# Patient Record
Sex: FEMALE | Race: BLACK OR AFRICAN AMERICAN | Employment: OTHER | URBAN - METROPOLITAN AREA
[De-identification: names, ages, dates, MRNs, and addresses within clinical notes are randomized per-mention and may not be internally consistent; named-entity substitution may affect disease eponyms.]

---

## 2021-08-16 PROBLEM — I10 HYPERTENSION: Status: ACTIVE | Noted: 2021-08-16

## 2021-08-16 PROBLEM — E66.01 MORBID OBESITY (HCC): Status: ACTIVE | Noted: 2021-08-16

## 2021-08-26 ENCOUNTER — HOSPITAL ENCOUNTER (OUTPATIENT)
Dept: LAB | Age: 56
Discharge: HOME | End: 2021-08-26
Attending: SURGERY
Payer: COMMERCIAL

## 2021-08-26 DIAGNOSIS — Z01.812 ENCOUNTER FOR PRE-OPERATIVE LABORATORY TESTING: ICD-10-CM

## 2021-08-26 LAB
25(OH)D3 SERPL-MCNC: 29.3 NG/ML (ref 30–100)
EST. AVERAGE GLUCOSE BLD GHB EST-MCNC: 123 MG/DL
HBA1C MFR BLD: 5.9 % (ref 4.2–6.3)
TSH SERPL DL<=0.005 MIU/L-ACNC: 1.03 UIU/ML

## 2021-08-26 PROCEDURE — 83036 HEMOGLOBIN GLYCOSYLATED A1C: CPT

## 2021-08-26 PROCEDURE — 36415 COLL VENOUS BLD VENIPUNCTURE: CPT

## 2021-08-26 PROCEDURE — 80323 ALKALOIDS NOS: CPT

## 2021-08-26 PROCEDURE — 84443 ASSAY THYROID STIM HORMONE: CPT

## 2021-08-26 PROCEDURE — 82306 VITAMIN D 25 HYDROXY: CPT

## 2021-08-30 LAB
COTININE SERPL-MCNC: <1 NG/ML
NICOTINE SERPL-MCNC: <1 NG/ML

## 2021-09-03 ENCOUNTER — HOSPITAL ENCOUNTER (OUTPATIENT)
Dept: PHYSICAL THERAPY | Age: 56
Discharge: HOME | End: 2021-09-03
Attending: SURGERY
Payer: COMMERCIAL

## 2021-09-03 DIAGNOSIS — E66.01 MORBID OBESITY (HCC): ICD-10-CM

## 2021-09-03 PROCEDURE — 97161 PT EVAL LOW COMPLEX 20 MIN: CPT

## 2021-09-03 PROCEDURE — 97110 THERAPEUTIC EXERCISES: CPT

## 2021-09-03 NOTE — PROGRESS NOTES
Ramon Yarsanism  : 1965  Payor: Haleigh Manzo / Plan: [facility] / Product Type: PPO /  [address]  at [facility] PUJA ROMAN  [address] [facility], [address], [facility] [address]  [phone]   [phone]       OUTPATIENT PHYSICAL THERAPY: Daily Treatment Note 9/3/2021  Visit Count:1     ICD-10: Treatment Diagnosis: muscle weakness M62.81  Precautions/Allergies:   Latex, Penicillin g, and Sulfa (sulfonamide antibiotics)  TREATMENT PLAN:  Effective Dates: 9/3/2021 TO Today (9/3/2021). Frequency/Duration: 1 visit for HEP MEDICAL/REFERRING DIAGNOSIS:  Morbid obesity ([facility] [address] [E66.01]   DATE OF ONSET: chronic  REFERRING PHYSICIAN: Rosio Rios*  MD Orders: referral to physical therapy   Return MD Appointment: TBD       Pre-treatment Symptoms/Complaints:  See Eval  Pain: Initial:   0/10 Post Session:  0/10   Medications Last Reviewed:  9-3-21    Updated Objective Findings:   See evaluation note from today     TREATMENT:   Therapeutic Exercise: ( 15 minutes ): Instructed in HEP with UE exercises with green t-band - bicep curls, overhead press, shoulder abduction, chest press. Core exercises with bridges, alternating LEs hip/knee extension then bicycle with maintaining pelvic tilt. LE exercises with side lying hip abduction, side lying hip adduction, and wall squats. Pt demonstrate each exercise. Verbal cueing for body alignment and positioning. HEP: handout given with each exercise  [employer] [facility]    Treatment/Session Summary:    · Response to Treatment:  good return demonstration of HEP. rUban Morse   · Communication/Consultation:  None today  · Equipment provided today:  issued green tubing    Total Treatment Billable Duration:  15 minutes + Evaluation  PT Patient Time In/Time Out  Time In: 945  Time Out: [address] Jose Antonio, PT    Future Appointments   Date Time Provider Erica Serrato   2021 11:00 AM SFE PHONE APPOINTMENT [facility] SFE   2021 11:10 AM [address] THRU SSA IMD IMD   9/29/2021  8:15 AM SFD XR RF ROOM 2 SFDRAD BKD

## 2021-09-03 NOTE — THERAPY EVALUATION
Wander Argueta  : 1965  Primary: [facility] Nini Beatty*  Secondary:  [facility], [address], [address] [facility]  [phone]   [phone]       OUTPATIENT PHYSICAL THERAPY:Initial Assessment and Discharge Summary 9/3/2021     ICD-10: Treatment Diagnosis: muscle weakness M62.81  Precautions/Allergies:   Latex, Penicillin g, and Sulfa (sulfonamide antibiotics)  TREATMENT PLAN:  Effective Dates: 9/3/2021 TO Today (9/3/2021). Frequency/Duration: 1 visit for HEP MEDICAL/REFERRING DIAGNOSIS:  Morbid obesity ([facility] [address] [E66.01]   DATE OF ONSET: chronic   REFERRING PHYSICIAN: Anil Vuong*  MD Orders: Referral to physical therapy   Return MD Appointment: TBD     INITIAL ASSESSMENT:  Ms. Tuan Cardoso presents to physical therapy in preparation for bariatric surgery. She is currently walking 2-3 days/week for exercise. Instructed patient in UE, core, and LE strengthening exercises to incorporate into her exercise routine. Also discussed increasing her walking gradually to 5-6 days/week. Pt verbalizes understanding. No further PT needs at this time. PROBLEM LIST (Impacting functional limitations):  1. Decreased Strength INTERVENTIONS PLANNED: (Treatment may consist of any combination of the following)  1. Therapeutic Exercise/Strengthening     GOALS: (Goals have been discussed and agreed upon with patient.)  Discharge Goals: Time Frame: 1 visit  1. Pt will be independent with HEP. OUTCOME MEASURE:   Tool Used: Lower Extremity Functional Scale (LEFS)  Score:  Initial: 80/80 Most Recent: X (Date: -- )   Interpretation of Score: 20 questions each scored on a 5 point scale with 0 representing \"extreme difficulty or unable to perform\" and 4 representing \"no difficulty\". The lower the score, the greater the functional disability. 80/80 represents no disability. Minimal detectable change is 9 points.     Total Duration: 45 minutes  PT Patient Time In/Time Out  Time In: 945  Time Out: 1030    Rehabilitation Potential For Stated Goals: Good  Regarding Nickolas Kumar therapy, I certify that the treatment plan above will be carried out by a therapist or under their direction. Thank you for this referral,    Ryan Ruffin, PT      Referring Physician Signature: Julia Delatorre* No Signature is Required for this note. PAIN/SUBJECTIVE:   Initial:   0/10 Post Session:  0/10   HISTORY:   History of Injury/Illness (Reason for Referral): Pt presents to physical therapy in preparation for bariatric surgery. She is currently walking 2-3/days per week. She likes strength training but is not performing any strength exercises. Past Medical History/Comorbidities:   Ms. Pineda Monday  has a past medical history of Hypertension and Morbid obesity ([address] Ms. Pineda Monday  has a past surgical history that includes hx  section () and hx hysterectomy (). 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 Ambulatory/Rehab Services H2 Model Falls Risk Assessment   Risk Factors:       No Risk Factors Identified Ability to Rise from Chair:       (0)  Ability to rise in a single movement   Falls Prevention Plan:       No modifications necessary   Total: (5 or greater = High Risk): 0    [facility]. All Rights Reserved. [facility] Patent #[id number]. Federal Law prohibits the replication, distribution or use without written permission from [facility] [place]   Current Medications:       Current Outpatient Medications:     OTHER, [place] - Two tablespoons daily, Disp: , Rfl:     Lactobac no.41/Bifidobact no.7 (PROBIOTIC-10 PO), Take 2 Capsules by mouth daily. , Disp: , Rfl:     OTHER, Black Seed Oil - One tablespoon daily, Disp: , Rfl:     phentermine 37.5 mg capsule, Take 37.5 mg by mouth every morning. (Patient not taking: Reported on 8/26/2021), Disp: , Rfl:     Olmesartan-amLODIPine-HCTZ 40-5-25 mg tab, Take  by mouth., Disp: , Rfl:     metoprolol succinate (TOPROL-XL) 100 mg tablet, Take 100 mg by mouth daily. , Disp: , Rfl:     valACYclovir (VALTREX) 500 mg tablet, Take 1,000 mg by mouth daily. For outbreak, Disp: , Rfl:    Date Last Reviewed:  9-3-21   Number of Personal Factors/Comorbidities that affect the Plan of Care: 0: LOW COMPLEXITY   EXAMINATION:   Observation/Orthostatic Postural Assessment: pt presents to physical therapy in no apparent distress  Palpation: n/t    ROM: shoulder and knee ROM WNL  Strength: trunk flexion 4/5; UE grossly 4/5, LE grossly 4/5  Special Tests: none  Neurological Screen: n/t. Functional Mobility:  Sit to/from Stand: independent. Bed Mobility: independent. Walking on level ground: independent. Body Structures Involved:  1. Joints  2. Muscles Body Functions Affected:  1. Neuromusculoskeletal Activities and Participation Affected:  1.  Community, Social and [place]   Number of elements (examined above) that affect the Plan of Care: 1-2: LOW COMPLEXITY   CLINICAL PRESENTATION:   Presentation: Stable and uncomplicated: LOW COMPLEXITY   CLINICAL DECISION MAKING:   Use of outcome tool(s) and clinical judgement create a POC that gives a: Clear prediction of patient's progress: LOW COMPLEXITY

## 2021-09-21 ENCOUNTER — HOSPITAL ENCOUNTER (OUTPATIENT)
Dept: SURGERY | Age: 56
Discharge: HOME | End: 2021-09-21

## 2021-09-23 VITALS — BODY MASS INDEX: 39.25 KG/M2 | HEIGHT: 69 IN | WEIGHT: 265 LBS

## 2021-09-23 NOTE — PERIOP NOTES
Patient verified name, , and procedure. Type: 1a; abbreviated assessment per anesthesia guidelines  Labs per surgeon: none  Labs per anesthesia: none    A negative Covid swab result is required to proceed with surgery; appointments are made by the surgeon office and test should be collected 7 days prior to surgery. The testing center is located at the . [address], [address] [facility]. Covid results pending. Instructed pt that they will be notified by the doctor office for time of arrival to GI lab. If any questions please call the GI lab at [phone]. Follow diet and prep instructions per office. May have clear liquids until 4 hours prior to time of arrival.    [place] Roxo or shower the night before and the am of surgery with antibacterial soap. No lotions, oils, powders, cologne on skin. No make up, eye make up or jewelry. Wear loose fitting comfortable, clean clothing. Must have adult present in building the entire time . Medications for the day of procedure None. The following discharge instructions reviewed with patient: medication given during procedure may cause drowsiness for several hours, therefore, do not drive or operate machinery for remainder of the day, no alcohol on the day of your procedure, resume regular diet and activity unless otherwise directed, for mild sore throat you may use Cepacol throat lozenges or warm salt water gargles as needed, call your physician for any problems or questions. Patient verbalizes understanding.

## 2021-09-26 ENCOUNTER — ANESTHESIA EVENT (OUTPATIENT)
Dept: ENDOSCOPY | Age: 56
End: 2021-09-26
Payer: COMMERCIAL

## 2021-09-26 RX ORDER — SODIUM CHLORIDE, SODIUM LACTATE, POTASSIUM CHLORIDE, CALCIUM CHLORIDE 600; 310; 30; 20 MG/100ML; MG/100ML; MG/100ML; MG/100ML
100 INJECTION, SOLUTION INTRAVENOUS CONTINUOUS
Status: CANCELLED | OUTPATIENT
Start: 2021-09-26

## 2021-09-26 RX ORDER — SODIUM CHLORIDE 0.9 % (FLUSH) 0.9 %
5-40 SYRINGE (ML) INJECTION EVERY 8 HOURS
Status: CANCELLED | OUTPATIENT
Start: 2021-09-26

## 2021-09-26 RX ORDER — SODIUM CHLORIDE 0.9 % (FLUSH) 0.9 %
5-40 SYRINGE (ML) INJECTION AS NEEDED
Status: CANCELLED | OUTPATIENT
Start: 2021-09-26

## 2021-09-27 ENCOUNTER — HOSPITAL ENCOUNTER (OUTPATIENT)
Age: 56
Setting detail: OUTPATIENT SURGERY
Discharge: HOME | End: 2021-09-27
Attending: SURGERY | Admitting: SURGERY
Payer: COMMERCIAL

## 2021-09-27 ENCOUNTER — ANESTHESIA (OUTPATIENT)
Dept: ENDOSCOPY | Age: 56
End: 2021-09-27
Payer: COMMERCIAL

## 2021-09-27 VITALS
DIASTOLIC BLOOD PRESSURE: 59 MMHG | OXYGEN SATURATION: 99 % | SYSTOLIC BLOOD PRESSURE: 101 MMHG | WEIGHT: 263 LBS | RESPIRATION RATE: 18 BRPM | HEIGHT: 69 IN | TEMPERATURE: 98.5 F | HEART RATE: 74 BPM | BODY MASS INDEX: 38.95 KG/M2

## 2021-09-27 DIAGNOSIS — E66.01 MORBID OBESITY (HCC): ICD-10-CM

## 2021-09-27 DIAGNOSIS — K21.9 GASTROESOPHAGEAL REFLUX DISEASE WITHOUT ESOPHAGITIS: ICD-10-CM

## 2021-09-27 LAB — H PYLORI AG TISS QL IMSTN: POSITIVE

## 2021-09-27 PROCEDURE — 74011000250 HC RX REV CODE- 250: Performed by: STUDENT IN AN ORGANIZED HEALTH CARE EDUCATION/TRAINING PROGRAM

## 2021-09-27 PROCEDURE — 88312 SPECIAL STAINS GROUP 1: CPT

## 2021-09-27 PROCEDURE — 2709999900 HC NON-CHARGEABLE SUPPLY: Performed by: SURGERY

## 2021-09-27 PROCEDURE — 76040000025: Performed by: SURGERY

## 2021-09-27 PROCEDURE — 87081 CULTURE SCREEN ONLY: CPT

## 2021-09-27 PROCEDURE — 74011250636 HC RX REV CODE- 250/636: Performed by: STUDENT IN AN ORGANIZED HEALTH CARE EDUCATION/TRAINING PROGRAM

## 2021-09-27 PROCEDURE — 88305 TISSUE EXAM BY PATHOLOGIST: CPT

## 2021-09-27 PROCEDURE — 43239 EGD BIOPSY SINGLE/MULTIPLE: CPT | Performed by: SURGERY

## 2021-09-27 PROCEDURE — 77030021593 HC FCPS BIOP ENDOSC BSC -A: Performed by: SURGERY

## 2021-09-27 PROCEDURE — 74011250636 HC RX REV CODE- 250/636: Performed by: ANESTHESIOLOGY

## 2021-09-27 PROCEDURE — 76060000031 HC ANESTHESIA FIRST 0.5 HR: Performed by: SURGERY

## 2021-09-27 RX ORDER — PROPOFOL 10 MG/ML
INJECTION, EMULSION INTRAVENOUS AS NEEDED
Status: DISCONTINUED | OUTPATIENT
Start: 2021-09-27 | End: 2021-09-27 | Stop reason: HOSPADM

## 2021-09-27 RX ORDER — LIDOCAINE HYDROCHLORIDE 20 MG/ML
INJECTION, SOLUTION EPIDURAL; INFILTRATION; INTRACAUDAL; PERINEURAL AS NEEDED
Status: DISCONTINUED | OUTPATIENT
Start: 2021-09-27 | End: 2021-09-27 | Stop reason: HOSPADM

## 2021-09-27 RX ORDER — SODIUM CHLORIDE, SODIUM LACTATE, POTASSIUM CHLORIDE, CALCIUM CHLORIDE 600; 310; 30; 20 MG/100ML; MG/100ML; MG/100ML; MG/100ML
100 INJECTION, SOLUTION INTRAVENOUS CONTINUOUS
Status: DISCONTINUED | OUTPATIENT
Start: 2021-09-27 | End: 2021-09-27 | Stop reason: HOSPADM

## 2021-09-27 RX ORDER — PANTOPRAZOLE SODIUM 40 MG/1
40 TABLET, DELAYED RELEASE ORAL DAILY
Qty: 30 TABLET | Refills: 0 | Status: SHIPPED | OUTPATIENT
Start: 2021-09-27 | End: 2021-10-19

## 2021-09-27 RX ADMIN — PROPOFOL 180 MCG/KG/MIN: 10 INJECTION, EMULSION INTRAVENOUS at 07:21

## 2021-09-27 RX ADMIN — LIDOCAINE HYDROCHLORIDE 50 MG: 20 INJECTION, SOLUTION EPIDURAL; INFILTRATION; INTRACAUDAL; PERINEURAL at 07:20

## 2021-09-27 RX ADMIN — SODIUM CHLORIDE, SODIUM LACTATE, POTASSIUM CHLORIDE, AND CALCIUM CHLORIDE 100 ML/HR: 600; 310; 30; 20 INJECTION, SOLUTION INTRAVENOUS at 06:41

## 2021-09-27 RX ADMIN — PROPOFOL 150 MG: 10 INJECTION, EMULSION INTRAVENOUS at 07:20

## 2021-09-27 NOTE — DISCHARGE INSTRUCTIONS
Gastrointestinal Esophagogastroduodenoscopy (EGD) - Upper Exam Discharge Instructions    1. Call Dr. Henry Adamson at office for any problems or questions. 2. Contact the doctor's office for follow up appointment as directed. 3. Medication may cause drowsiness for several hours, therefore:  · Do not drive or operate machinery for remainder of the day. · No alcohol today. · Do not make any important or legal decisions for 24 hours. · Do not sign any legal documents for 24 hours. 5. Ordinarily, you may resume regular diet and activity after exam unless otherwise specified by your physician. 6. For mild soreness in your throat you may use Cepacol throat lozenges or warm salt-water gargles as needed. Any additional instructions:  ***     Instructions given to Nabeel Rafa and other family members.

## 2021-09-27 NOTE — ANESTHESIA PREPROCEDURE EVALUATION
Relevant Problems   No relevant active problems       Anesthetic History   No history of anesthetic complications            Review of Systems / Medical History  Patient summary reviewed, nursing notes reviewed and pertinent labs reviewed    Pulmonary  Within defined limits                 Neuro/Psych   Within defined limits           Cardiovascular    Hypertension: well controlled              Exercise tolerance: >4 METS     GI/Hepatic/Renal  Within defined limits              Endo/Other        Morbid obesity     Other Findings              Physical Exam    Airway  Mallampati: I  TM Distance: > 6 cm  Neck ROM: normal range of motion   Mouth opening: Normal     Cardiovascular  Regular rate and rhythm,  S1 and S2 normal,  no murmur, click, rub, or gallop             Dental  No notable dental hx       Pulmonary  Breath sounds clear to auscultation               Abdominal         Other Findings            Anesthetic Plan    ASA: 3  Anesthesia type: total IV anesthesia          Induction: Intravenous  Anesthetic plan and risks discussed with: Patient and Spouse

## 2021-09-27 NOTE — OP NOTES
Esophagogastroduodenoscopy Procedure Note    Date of procedure: 2021      Name: Mak Saleem      MRN: [id number]       : 1965       Age: 64 y.o. Sex: female    Preoperative Diagnosis: 1. GERD          Postoperative Diagnosis: 1. same      2. Mild gastritis      3. Tiny sliding hiatal hernia      4. [place] colored mucosa at GEJ    Procedure(s) with comments:  ESOPHAGOGASTRODUODENOSCOPY (EGD)/ 40 - EGD  ESOPHAGOGASTRODUODENAL (EGD) BIOPSY - Bxs/ IRASEMA test 42715    Procedure in Detail:  Informed consent was obtained for the procedure, the patient was brought to the endoscopy suite and sedation was induced by anesthesia. The [id number] gastroscope was inserted into the mouth and advanced under direct vision to second portion of the duodenum. A careful inspection was made as the gastroscope was withdrawn, including a retroflexed view of the proximal stomach; findings and interventions are described below, with appropriate photodocumentation obtained. Findings:   Oropharynx:   Normal  Esophagus:       - small area of salmon colored mucosa in GEJ - biopsied  GEJ 38 cm  Cardia of the stomach:    Normal  Body of the stomach:      - Flat lesions:     - mild gastritis  Fundus of the stomach:    Normal  Antrum of the stomach:      - Flat lesions:     - mild gastritis  Duodenum:    Normal        Therapies:    biopsy of esophagus  biopsy of stomach body, antrum, pre pyloric antrum    EBL-  minimal    Specimens: Specimens were collected and sent to pathology. ID Type Source Tests Collected by Time Destination   1 : Gastric Bxs Preservative   Caty Gomes MD 2021 0348 Pathology   2 : Esophageal Bxs Preservative   Caty Gomes MD 2021 8788 Pathology   1 : IRASEMA test Tissue Gastric H PYLORI, TISSUE (LAB) Caty Gomes MD 2021 1238 Microbiology               Complications:   None; patient tolerated the procedure well.            Recommendations:  - Acid suppression with a proton pump inhibitor.  - Await pathology. - Await IRASEMA test result and treat for Helicobacter pylori if positive.     Signed by: Demar Harrell MD  [facility] - Bariatric & Minimally Invasive Surgery  9/27/2021 7:29 AM

## 2021-09-27 NOTE — H&P
Gilbert Mae MD   Bariatric & Advanced Laparoscopic Surgery & Endoscopy  [address] [place], [place] [address] [address]  Phone [phone]   Fax [phone]      Date of visit: 2021      Primary/Requesting provider: Dillan Cruz MD         Name: Jacinta Kemp      MRN: [id number]       : 1965       Age: 64 y.o. Sex: female        PCP: Dillan Cruz MD     CC:  No chief complaint on file. HPI:     Jacinta Kemp is a 64 y.o. female who presents for EGD. PMH:    Past Medical History:   Diagnosis Date    Hypertension     managed with medication     Iron deficiency anemia     Morbid obesity ([address]        PSH:    Past Surgical History:   Procedure Laterality Date    HX  SECTION      HX HYSTERECTOMY      HX TUBAL LIGATION         MEDS:    Current Facility-Administered Medications   Medication    lactated Ringers infusion       ALLERGIES:      Allergies   Allergen Reactions    Latex Rash    Penicillin G Rash and Nausea Only    Sulfa (Sulfonamide Antibiotics) Rash and Palpitations       SH:    Social History     Tobacco Use    Smoking status: Never Smoker    Smokeless tobacco: Never Used   Substance Use Topics    Alcohol use: Not Currently    Drug use: Not on file       FH:    History reviewed. No pertinent family history. Review of systems:  ROS      Physical Exam:     Visit Vitals  /77   Pulse 90   Temp 98.5 °F (36.9 °C)   Resp 18   Ht 5' 8.5\" (1.74 m)   Wt 263 lb (119.3 kg)   SpO2 94%   BMI 39.41 kg/m²       General:  Well-developed, well-nourished, no distress. Psych:  Cooperative, good insight and judgement. Neuro:  Alert, oriented to person, place and time. HEENT:  Normocephalic, atraumatic. Sclera clear. Lungs:  Unlabored breathing. Symmetrical chest expansion. Chest wall:  No tenderness or deformity. Heart:  Regular rate and rhythm. No JVD. Abdomen:  Soft, non-tender, non-distended. No palpable masses. Extremities:  Extremities normal, atraumatic, no cyanosis or edema. Skin:  Skin color, texture, turgor normal. No rashes. Assessment:  Erinn Barker is a 64 y.o. female here for an EGD. 1. Heartburn, GERD      Recommendations:     1. Proceed with EGD. The risks, benefits, alternatives, and consequences were reviewed with the patient, who expressed verbal understanding and agreement to proceed. Potential rare, but serious complications of endoscopy include damage to the mucosal lining or internal organs which can result in bleeding or perforation and require blood transfusion or surgery. Infections such as pneumonia are also possible given the risk of aspirating while under sedation. There is also a possiblility of cardiac or pulmonary complications such as MI, hypoxemic respiratory failure, or need for post-procedural mechanical ventilation, all of which would be related to the stress of endoscopy or sedation.          Signed: Joni Jennings MD  Bariatric & Minimally Invasive Surgery  [facility]  9/27/2021 6:58 AM

## 2021-09-27 NOTE — ANESTHESIA POSTPROCEDURE EVALUATION
Procedure(s):  ESOPHAGOGASTRODUODENOSCOPY (EGD)/ 40  ESOPHAGOGASTRODUODENAL (EGD) BIOPSY.     total IV anesthesia    Anesthesia Post Evaluation      Multimodal analgesia: multimodal analgesia not used between 6 hours prior to anesthesia start to PACU discharge  Patient location during evaluation: bedside  Patient participation: complete - patient participated  Level of consciousness: awake and alert  Pain management: adequate  Airway patency: patent  Anesthetic complications: no  Cardiovascular status: acceptable  Respiratory status: acceptable, spontaneous ventilation and nonlabored ventilation  Hydration status: acceptable  Post anesthesia nausea and vomiting:  none      INITIAL Post-op Vital signs:   Vitals Value Taken Time   BP 94/53 09/27/21 0748   Temp     Pulse 77 09/27/21 0748   Resp 16 09/27/21 0748   SpO2 96 % 09/27/21 0748

## 2021-09-29 ENCOUNTER — HOSPITAL ENCOUNTER (OUTPATIENT)
Dept: GENERAL RADIOLOGY | Age: 56
Discharge: HOME | End: 2021-09-29
Attending: SURGERY
Payer: COMMERCIAL

## 2021-09-29 DIAGNOSIS — Z87.19 HX OF GASTROESOPHAGEAL REFLUX (GERD): ICD-10-CM

## 2021-09-29 PROCEDURE — 74240 X-RAY XM UPR GI TRC 1CNTRST: CPT

## 2021-09-29 PROCEDURE — 74011000250 HC RX REV CODE- 250: Performed by: SURGERY

## 2021-09-29 RX ADMIN — ANTACID/ANTIFLATULENT 4 G: 380; 550; 10; 10 GRANULE, EFFERVESCENT ORAL at 09:11

## 2021-09-29 RX ADMIN — BARIUM SULFATE 355 ML: 0.6 SUSPENSION ORAL at 09:11

## 2021-09-29 RX ADMIN — BARIUM SULFATE 135 ML: 980 POWDER, FOR SUSPENSION ORAL at 09:11

## 2021-09-30 NOTE — PROGRESS NOTES
On your recent EGD, you were found to be positive for the H pylori bacteria. You will be prescribe 3 medications to eradicate this bacteria in addition to the pantoprazole already prescribed. Metronidazole (Flagyl) 500mg - Take 1 tab three times a day for 14 days  Bismuth subsalicylate 896 mg chew - Take 2 tabs two times a day for 14 days  Doxycycline 100 mg capsule - Take 1 capsule two times a day for 14 days    Take your antibiotics as directed. Do not stop taking them just because you feel better. You need to take the full course of antibiotics. After you complete this regimen, stop all antiacids for 2 weeks to have a follow up study. We will order a stool study to confirm eradication of the H pylori bacteria.  Antiacids can affect the result of this test.     Demar Harrell MD  Bariatric & Minimally Invasive Surgery  [facility]  9/30/2021 7:51 AM

## 2021-10-01 NOTE — PROGRESS NOTES
Patient came in for an appointment with Wang Guzman. PA on 10. 1.2021, and results were given by Blas Lott.

## (undated) DEVICE — CONNECTOR TBNG OD5-7MM O2 END DISP

## (undated) DEVICE — BLOCK BITE AD 60FR W/ VELC STRP ADDRESSES MOST PT AND

## (undated) DEVICE — FORCEPS BX L240CM JAW DIA2.8MM L CAP W/ NDL MIC MESH TOOTH

## (undated) DEVICE — CANNULA NSL ORAL AD FOR CAPNOFLEX CO2 O2 AIRLFE

## (undated) DEVICE — CONTAINER PREFIL FRMLN 40ML --

## (undated) DEVICE — KENDALL RADIOLUCENT FOAM MONITORING ELECTRODE RECTANGULAR SHAPE: Brand: KENDALL